# Patient Record
Sex: FEMALE | Race: WHITE | NOT HISPANIC OR LATINO | Employment: FULL TIME | URBAN - METROPOLITAN AREA
[De-identification: names, ages, dates, MRNs, and addresses within clinical notes are randomized per-mention and may not be internally consistent; named-entity substitution may affect disease eponyms.]

---

## 2017-12-12 ENCOUNTER — OFFICE VISIT (OUTPATIENT)
Dept: OBSTETRICS AND GYNECOLOGY | Facility: CLINIC | Age: 42
End: 2017-12-12

## 2017-12-12 VITALS
DIASTOLIC BLOOD PRESSURE: 72 MMHG | BODY MASS INDEX: 37.12 KG/M2 | WEIGHT: 231 LBS | SYSTOLIC BLOOD PRESSURE: 128 MMHG | HEIGHT: 66 IN

## 2017-12-12 DIAGNOSIS — Z01.419 WELL WOMAN EXAM WITH ROUTINE GYNECOLOGICAL EXAM: Primary | ICD-10-CM

## 2017-12-12 PROCEDURE — 99396 PREV VISIT EST AGE 40-64: CPT | Performed by: NURSE PRACTITIONER

## 2017-12-12 RX ORDER — OMEPRAZOLE 20 MG/1
20 CAPSULE, DELAYED RELEASE ORAL DAILY
COMMUNITY
End: 2022-04-01

## 2017-12-12 NOTE — PROGRESS NOTES
[facility]  Routine Annual Visit    2017    Patient: Rosie Guzman          MR#:[id number]      History of Present Illness    42 y.o. female  who presents for annual exam. Former pt of Dr. Ring. Last pap negative, HPV negative 2016. Mammogram today. Rosie reports regular, monthly cycles about every 31 days with light bleeding and mild cramping. She is using husbands vasectomy for contraception. She has no problems or complaints today. She denies any medical hx.      Patient's last menstrual period was 2017.  Obstetric History:  OB History      Para Term  AB Living    3 2 2  1 2    SAB TAB Ectopic Multiple Live Births    1    2         Menstrual History:     Patient's last menstrual period was 2017.       Sexual History:       ________________________________________  There is no problem list on file for this patient.      Past Medical History:   Diagnosis Date   • Acne    • Weight gain        Past Surgical History:   Procedure Laterality Date   •  SECTION     • SEPTOPLASTY         History   Smoking Status   • Never Smoker   Smokeless Tobacco   • Never Used       Family History   Problem Relation Age of Onset   • Heart disease Maternal Grandmother    • Diabetes Maternal Grandmother    • Heart attack Brother    • No Known Problems Son    • No Known Problems Daughter        Prior to Admission medications    Medication Sig Start Date End Date Taking? Authorizing Provider   ACZONE 5 % topical gel APPLY TO THE FACE BID 16   Historical Provider, MD   esomeprazole (nexIUM) 20 MG capsule Take 20 mg by mouth Every Morning Before Breakfast.    Historical Provider, MD   melatonin 1 MG tablet Take  by mouth.    Historical Provider, MD   phytonadione (MEPHYTON, VITAMIN K) 5 MG tablet Take 5 mg by mouth 1 (One) Time.    Historical Provider, MD   progesterone (PROMETRIUM) 200 MG capsule Take 1 capsule by mouth Daily. 16   Genevieve Ring MD  "  spironolactone (ALDACTONE) 100 MG tablet Take 100 mg by mouth Daily.    Historical Provider, MD   tretinoin microspheres (RETIN-A MICRO) 0.04 % gel APPLY TO FACE QHS 10/13/16   Historical Provider, MD   vitamin B-12 (CYANOCOBALAMIN) 100 MCG tablet Take 50 mcg by mouth Daily.    Historical Provider, MD   vitamin D (ERGOCALCIFEROL) 57356 UNITS capsule capsule Take 50,000 Units by mouth 1 (One) Time Per Week.    Historical Provider, MD     ________________________________________        The following portions of the patient's history were reviewed and updated as appropriate: allergies, current medications, past family history, past medical history, past social history, past surgical history and problem list.    Review of Systems   Constitutional: Negative.    HENT: Negative.    Eyes: Negative for visual disturbance.   Respiratory: Negative for cough, shortness of breath and wheezing.    Cardiovascular: Negative for chest pain, palpitations and leg swelling.   Gastrointestinal: Negative for abdominal distention, abdominal pain, blood in stool, constipation, diarrhea, nausea and vomiting.   Endocrine: Negative for cold intolerance and heat intolerance.   Genitourinary: Negative for difficulty urinating, dyspareunia, dysuria, frequency, genital sores, hematuria, menstrual problem, pelvic pain, urgency, vaginal bleeding, vaginal discharge and vaginal pain.   Musculoskeletal: Negative.    Skin: Negative.    Neurological: Negative for dizziness, weakness, light-headedness, numbness and headaches.   Hematological: Negative.    Psychiatric/Behavioral: Negative.    Breasts: negative for lumps skin changes, dimpling, swelling, nipple changes/discharge bilaterally       Objective   Physical Exam    /72  Ht 167.6 cm (66\")  Wt 105 kg (231 lb)  LMP 11/05/2017  BMI 37.28 kg/m2   BP Readings from Last 3 Encounters:   12/12/17 128/72   11/17/16 122/80      Wt Readings from Last 3 Encounters:   12/12/17 105 kg (231 lb) " "  11/17/16 97.6 kg (215 lb 3.2 oz)        BMI: Estimated body mass index is 37.28 kg/(m^2) as calculated from the following:    Height as of this encounter: 167.6 cm (66\").    Weight as of this encounter: 105 kg (231 lb).        General:   alert, appears stated age and cooperative   Heart: regular rate and rhythm, S1, S2 normal, no murmur, click, rub or gallop   Lungs: clear to auscultation bilaterally   Abdomen: soft, non-tender, without masses or organomegaly   Breast: inspection negative, no nipple discharge or bleeding, no masses or nodularity palpable   Vulva: normal   Vagina: normal mucosa, normal discharge   Cervix: no bleeding following Pap, no cervical motion tenderness and no lesions   Uterus: normal size, mobile, non-tender or normal shape and consistency   Adnexa: exam limited by habitus     Assessment:    1. Normal annual exam   2. Healthy lifestyle modifications discussed, counseled on self breast exams and bone health      Plan:    []  Rx:   [x]  Mammogram  [x]  PAP done  []  Occult fecal blood test (Insure)  []  Labs:   []  GC/Chl/TV  []  DEXA scan   []  Referral for colonoscopy:           ELIECER Michel  12/12/2017 9:29 AM  "

## 2017-12-14 LAB
CYTOLOGIST CVX/VAG CYTO: NORMAL
CYTOLOGY CVX/VAG DOC THIN PREP: NORMAL
DX ICD CODE: NORMAL
HIV 1 & 2 AB SER-IMP: NORMAL
HPV I/H RISK 4 DNA CVX QL PROBE+SIG AMP: NEGATIVE
OTHER STN SPEC: NORMAL
PATH REPORT.FINAL DX SPEC: NORMAL
STAT OF ADQ CVX/VAG CYTO-IMP: NORMAL

## 2017-12-18 ENCOUNTER — TELEPHONE (OUTPATIENT)
Dept: OBSTETRICS AND GYNECOLOGY | Facility: CLINIC | Age: 42
End: 2017-12-18

## 2017-12-18 NOTE — TELEPHONE ENCOUNTER
----- Message from ELIECER Durham sent at 12/14/2017  4:17 PM EST -----  Please inform patient her mammogram was normal. Repeat 1 year. Also pap returned negative. Thank you.

## 2022-04-01 ENCOUNTER — OFFICE VISIT (OUTPATIENT)
Dept: OBSTETRICS AND GYNECOLOGY | Age: 47
End: 2022-04-01

## 2022-04-01 VITALS
BODY MASS INDEX: 30.41 KG/M2 | WEIGHT: 189.2 LBS | SYSTOLIC BLOOD PRESSURE: 124 MMHG | HEIGHT: 66 IN | DIASTOLIC BLOOD PRESSURE: 72 MMHG

## 2022-04-01 DIAGNOSIS — Z76.89 ENCOUNTER TO ESTABLISH CARE: ICD-10-CM

## 2022-04-01 DIAGNOSIS — R87.610 ASCUS WITH POSITIVE HIGH RISK HPV CERVICAL: Primary | ICD-10-CM

## 2022-04-01 DIAGNOSIS — R87.810 ASCUS WITH POSITIVE HIGH RISK HPV CERVICAL: Primary | ICD-10-CM

## 2022-04-01 PROCEDURE — 99203 OFFICE O/P NEW LOW 30 MIN: CPT | Performed by: STUDENT IN AN ORGANIZED HEALTH CARE EDUCATION/TRAINING PROGRAM

## 2022-04-01 RX ORDER — METFORMIN HYDROCHLORIDE 500 MG/1
500 TABLET, EXTENDED RELEASE ORAL 2 TIMES DAILY
COMMUNITY
Start: 2022-03-11

## 2022-04-01 RX ORDER — TRAZODONE HYDROCHLORIDE 50 MG/1
50 TABLET ORAL NIGHTLY PRN
COMMUNITY
Start: 2022-03-13

## 2022-04-01 RX ORDER — DIETHYLPROPION HYDROCHLORIDE 75 MG/1
TABLET ORAL
COMMUNITY
Start: 2022-03-22

## 2022-04-01 NOTE — PROGRESS NOTES
[facility]   Obstetrics and Gynecology   New Gynecology Visit    2022    Patient: Rosie Guzman          MR#:[id number]    History of Present Illness    Chief Complaint   Patient presents with   • Establish Care   • Abnormal Pap Smear     NGYN - Reestablish care - Last AE 2017 w/pap nml and HPV neg, Mg 2017, Pap 03/15/2022 abnormal       47 y.o. female  who presents for follow-up of ASCUS and HPV positive Pap on 3/15/22 (in media tab).  Denies immunocompromised state and tobacco use.  Sexually active with  only but they are discussing separation.  Reports no menses for 18 months now.  Also having hot flashes and night sweats.  Recent abnormal mammogram that is admits to follow-up currently.  Recent DEXA showed osteopenia.      Obstetric History:  OB History        3    Para   2    Term   2            AB   1    Living   2       SAB   1    IAB        Ectopic        Molar        Multiple        Live Births   2               Menstrual History:     No LMP recorded (lmp unknown). Patient is perimenopausal.       Sexual History:  Sexually active with  only, discussing separation at this time    Social History:  Denies tobacco use  ________________________________________  There is no problem list on file for this patient.    Past Medical History:   Diagnosis Date   • Acne    • Weight gain      Past Surgical History:   Procedure Laterality Date   •  SECTION     • SEPTOPLASTY       Social History     Tobacco Use   Smoking Status Never Smoker   Smokeless Tobacco Never Used     Family History   Problem Relation Age of Onset   • No Known Problems Father    • No Known Problems Mother    • Heart attack Brother    • No Known Problems Son    • No Known Problems Daughter    • Heart disease Maternal Grandmother    • Diabetes Maternal Grandmother    • Breast cancer Neg Hx    • Ovarian cancer Neg Hx    • Uterine cancer Neg Hx    • Colon cancer Neg Hx       Prior to Admission medications    Medication Sig Start Date End Date Taking? Authorizing Provider   Diethylpropion HCl ER 75 MG tablet sustained-release 24 hour TAKE 1 TABLET BY MOUTH EVERY DAY LATE MORNING 3/22/22  Yes Jenifer Son MD   esomeprazole (nexIUM) 20 MG capsule Take 20 mg by mouth Every Morning Before Breakfast.   Yes Jenifer Son MD   melatonin 1 MG tablet Take  by mouth.   Yes Jenifer Son MD   metFORMIN ER (GLUCOPHAGE-XR) 500 MG 24 hr tablet Take 500 mg by mouth 2 (Two) Times a Day. 3/11/22  Yes Jenifer Son MD   phytonadione (MEPHYTON, VITAMIN K) 5 MG tablet Take 5 mg by mouth 1 (One) Time.   Yes Jenifer Son MD   traZODone (DESYREL) 50 MG tablet Take 50 mg by mouth At Night As Needed. 3/13/22  Yes Jenifer Son MD   vitamin B-12 (CYANOCOBALAMIN) 100 MCG tablet Take 50 mcg by mouth Daily.   Yes Jenifer Son MD   vitamin D (ERGOCALCIFEROL) 22845 UNITS capsule capsule Take 50,000 Units by mouth 1 (One) Time Per Week.   Yes Jenifer Son MD   ACZONE 5 % topical gel APPLY TO THE FACE BID 11/8/16 4/1/22  Jenifer Son MD   omeprazole (priLOSEC) 20 MG capsule Take 20 mg by mouth Daily.  4/1/22  Jenifer Son MD   progesterone (PROMETRIUM) 200 MG capsule Take 1 capsule by mouth Daily. 11/17/16 4/1/22  Genevieve Ring MD   spironolactone (ALDACTONE) 100 MG tablet Take 100 mg by mouth Daily.  4/1/22  Jenifer Son MD   tretinoin microspheres (RETIN-A MICRO) 0.04 % gel APPLY TO FACE QHS 10/13/16 4/1/22  Jenifer Son MD     ________________________________________    The following portions of the patient's history were reviewed and updated as appropriate: allergies, current medications, past family history, past medical history, past social history, past surgical history and problem list.    Review of Systems   All other systems reviewed and are negative.           Objective     /72   Ht 167.6  "cm (66\")   Wt 85.8 kg (189 lb 3.2 oz)   LMP  (LMP Unknown)   Breastfeeding No   BMI 30.54 kg/m²    BP Readings from Last 3 Encounters:   04/01/22 124/72   12/12/17 128/72   11/17/16 122/80      Wt Readings from Last 3 Encounters:   04/01/22 85.8 kg (189 lb 3.2 oz)   12/12/17 105 kg (231 lb)   11/17/16 97.6 kg (215 lb 3.2 oz)        BMI: Estimated body mass index is 30.54 kg/m² as calculated from the following:    Height as of this encounter: 167.6 cm (66\").    Weight as of this encounter: 85.8 kg (189 lb 3.2 oz).    Physical Exam  Vitals and nursing note reviewed.   Constitutional:       General: She is not in acute distress.     Appearance: Normal appearance.   Pulmonary:      Effort: Pulmonary effort is normal. No respiratory distress.   Neurological:      General: No focal deficit present.      Mental Status: She is alert and oriented to person, place, and time.   Psychiatric:         Mood and Affect: Mood normal.         Behavior: Behavior normal.         Thought Content: Thought content normal.         Judgment: Judgment normal.             Assessment:  Diagnoses and all orders for this visit:    1. ASCUS with positive high risk HPV cervical (Primary)    2. Encounter to establish care    -Discussed pathophysiology of cervical cancer and its screenings.  Discussed presence of HPV.  Recommendation for colposcopy.  Schedule ASAP.  -Patient would like to establish annual care here after colpo.    Plan:  Return for colpo next Friday.    I spent 30 minutes caring for Rosie on this date of service. This time includes time spent by me in the following activities: preparing for the visit, reviewing tests, obtaining and/or reviewing a separately obtained history, performing a medically appropriate examination and/or evaluation, counseling and educating the patient/family/caregiver, ordering medications, tests, or procedures and documenting information in the medical record    Heather Leon MD  4/1/2022 14:29 " EDT

## 2022-04-08 ENCOUNTER — OFFICE VISIT (OUTPATIENT)
Dept: OBSTETRICS AND GYNECOLOGY | Age: 47
End: 2022-04-08

## 2022-04-08 VITALS
SYSTOLIC BLOOD PRESSURE: 122 MMHG | HEIGHT: 66 IN | WEIGHT: 186 LBS | BODY MASS INDEX: 29.89 KG/M2 | DIASTOLIC BLOOD PRESSURE: 74 MMHG

## 2022-04-08 DIAGNOSIS — Z01.818 PREPROCEDURAL EXAMINATION: ICD-10-CM

## 2022-04-08 DIAGNOSIS — R87.610 ATYPICAL SQUAMOUS CELLS OF UNDETERMINED SIGNIFICANCE ON CYTOLOGIC SMEAR OF CERVIX (ASC-US): Primary | ICD-10-CM

## 2022-04-08 LAB
B-HCG UR QL: NEGATIVE
EXPIRATION DATE: NORMAL
INTERNAL NEGATIVE CONTROL: NEGATIVE
INTERNAL POSITIVE CONTROL: POSITIVE
Lab: NORMAL

## 2022-04-08 PROCEDURE — 81025 URINE PREGNANCY TEST: CPT | Performed by: STUDENT IN AN ORGANIZED HEALTH CARE EDUCATION/TRAINING PROGRAM

## 2022-04-08 PROCEDURE — 57454 BX/CURETT OF CERVIX W/SCOPE: CPT | Performed by: STUDENT IN AN ORGANIZED HEALTH CARE EDUCATION/TRAINING PROGRAM

## 2022-04-08 NOTE — PROGRESS NOTES
Colposcopy Procedure Note    Indications: Recent pap smear showed ASCUS/HPV pos    Procedure Details   The risks and benefits of the procedure were reviewed, and verbal informed consent was obtained.  Speculum was placed in vagina, and excellent visualization of cervix achieved.  Cervix was swabbed x 3 with acetic acid solution and then Lugol's solution.  Colposcopic exam revealed findings noted above.  Cervical biopsy was performed at 12 o'clock.  Endocervical curettage was performed but cervix was stenotic. Hemostasis was achieved with combination of silver nitrate and Monsel's solution.    Findings: complete squamocolumnar junction was identified, cervix showed acetowhite changes and nonstaining area at 12 o'clock (width from 11-1 o'clock; neg margins could be achieved if necessary)    Specimens: endocervical curettings, cervical biopsy at 12 o'clock    Complications: none, patient tolerated the procedure well    Plan:  Specimens sent to pathology  Will base treatment plan on these results  Patient instructed to take NSAIDs for pain control  No follow up appointment needed at this time, will call patient with results    Patient did obtain f/u diagnostic mammo and Us at [facility] that were normal, plan to repeat mammo in 1 yr    Heather Leon MD  04/08/22  13:18 EDT

## 2022-04-12 LAB
DX ICD CODE: NORMAL
DX ICD CODE: NORMAL
PATH REPORT.FINAL DX SPEC: NORMAL
PATH REPORT.GROSS SPEC: NORMAL
PATH REPORT.SITE OF ORIGIN SPEC: NORMAL
PATHOLOGIST NAME: NORMAL
PAYMENT PROCEDURE: NORMAL

## 2022-04-13 ENCOUNTER — TELEPHONE (OUTPATIENT)
Dept: OBSTETRICS AND GYNECOLOGY | Age: 47
End: 2022-04-13

## 2022-04-13 NOTE — PROGRESS NOTES
Please call patient to let her know that her cervical biopsy showed EUGENIA-1.  This is considered a low-grade lesion so we we will plan to repeat Pap smear in 1 year; please schedule this annual care visit.  She is at a slightly higher risk of developing cervical cancer so it is important to maintain close follow-up.  Please let me know she has any questions.

## 2022-04-13 NOTE — TELEPHONE ENCOUNTER
TC for results:  cervical biopsy showed UEGENIA-1.  This is considered a low-grade lesion so we we will plan to repeat Pap smear in 1 year; please schedule this annual care visit.  She is at a slightly higher risk of developing cervical cancer so it is important to maintain close follow-up.  Please let me know she has any questions.

## 2022-05-13 ENCOUNTER — PATIENT ROUNDING (BHMG ONLY) (OUTPATIENT)
Dept: OBSTETRICS AND GYNECOLOGY | Age: 47
End: 2022-05-13

## 2022-05-13 NOTE — PROGRESS NOTES
May 13, 2022    [place], may I speak with Rosie Guzman?    My name is Yenny Martel     I am  with [facility]  [address]   [facility] [address] [phone].    Before we get started may I verify your date of birth? 1975    I am calling to officially welcome you to our practice and ask about your recent visit. Is this a good time to talk? No, voicemail left     Tell me about your visit with us. What things went well?       We're always looking for ways to make our patients' experiences even better. Do you have recommendations on ways we may improve?     Overall were you satisfied with your first visit to our practice?      I appreciate you taking the time to speak with me today. Is there anything else I can do for you?     Thank you, and have a great day.

## 2025-07-08 ENCOUNTER — OFFICE VISIT (OUTPATIENT)
Dept: FAMILY MEDICINE CLINIC | Facility: CLINIC | Age: 50
End: 2025-07-08
Payer: COMMERCIAL

## 2025-07-08 VITALS
TEMPERATURE: 98.3 F | BODY MASS INDEX: 36.54 KG/M2 | HEIGHT: 67 IN | HEART RATE: 95 BPM | SYSTOLIC BLOOD PRESSURE: 144 MMHG | DIASTOLIC BLOOD PRESSURE: 92 MMHG | OXYGEN SATURATION: 100 % | WEIGHT: 232.8 LBS

## 2025-07-08 DIAGNOSIS — N87.0 DYSPLASIA OF CERVIX, LOW GRADE (CIN 1): ICD-10-CM

## 2025-07-08 DIAGNOSIS — R03.0 ELEVATED BLOOD PRESSURE READING: ICD-10-CM

## 2025-07-08 DIAGNOSIS — Z11.59 NEED FOR HEPATITIS C SCREENING TEST: ICD-10-CM

## 2025-07-08 DIAGNOSIS — F43.23 ADJUSTMENT DISORDER WITH MIXED ANXIETY AND DEPRESSED MOOD: Primary | ICD-10-CM

## 2025-07-08 DIAGNOSIS — R73.09 ELEVATED GLUCOSE: ICD-10-CM

## 2025-07-08 DIAGNOSIS — R53.83 OTHER FATIGUE: ICD-10-CM

## 2025-07-08 DIAGNOSIS — R63.5 WEIGHT GAIN: ICD-10-CM

## 2025-07-08 PROCEDURE — 99204 OFFICE O/P NEW MOD 45 MIN: CPT | Performed by: FAMILY MEDICINE

## 2025-07-08 RX ORDER — VENLAFAXINE HYDROCHLORIDE 75 MG/1
75 CAPSULE, EXTENDED RELEASE ORAL DAILY
Qty: 30 CAPSULE | Refills: 1 | Status: SHIPPED | OUTPATIENT
Start: 2025-07-08

## 2025-07-08 NOTE — PROGRESS NOTES
Chief Complaint  Establish Care, feet pain, Anxiety, Depression, Urinary Incontinence, and Dyspareunia    Subjective        Rosie Guzman presents to [facility]  History of Present Illness  History of Present Illness  The patient is a 50-year-old female who presents to [facility] as a new patient.    She has been under significant stress due to her separation from her , the death of her father and mother-in-law, and multiple relocations. She is currently going through menopause and has developed a fear of doctors. Despite these challenges, she is making efforts to prioritize her own health. She has been in therapy but struggles with focus due to ADHD. She has not seen her therapist since 10/2024 due to work and family commitments. She is not legally  from her  and is not considering reconciliation. She works remotely as a  in [email or web address], a role that involves high expectations and responsibility for a team. She feels deceived by a long-time friend who interfered in her marriage. She has two close friends for support. She has four children and two grandchildren, not all of whom live with her. Her brother passed away at 43 years old in 2015 due to a heart attack following surgery. She has recently discovered an older sister through [email or web address]. She was previously seeing a counselor with her  to manage their separation and family matters. She has raised her 's two children since they were 9 and 11 years old. She has not seen a psychiatrist or considered medication. She reports no suicidal thoughts. She believes she needs to reconnect with herself before she can connect with others. She wants to improve her mental clarity and productivity. She has been practicing self-regulation and coping techniques such as breathing exercises, which she finds helpful. She has been sleeping better recently, averaging 7.5 to 8  "hours per night, although she occasionally wets the bed. She has gained about 40 pounds since 2024 and consumes food emotionally. She has experienced significant weight fluctuations, losing up to 100 pounds at a time, which she can maintain for a year or two. She has been on Zoloft in the past, which initially helped but became less effective after three months. She did not follow up for dose adjustment as she did not want to be on medication long-term. She experiences brain fog and dizziness and feels her hormones are imbalanced. She reports no hallucinations or delusions. Her last blood work was done in  or . She believes menopause has worsened her brain fog and hot flashes. She also reports excessive fatigue.    She has not had a menstrual cycle for 4 to 5 years. Her last gynecologist visit was in , where she was diagnosed with HPV and had a biopsy. She has not followed up since then.    She has a rash on her neck that appeared two days ago, which she attributes to a new vitamin C product. The rash does not itch.    She experiences edema and describes a sensation like walking on gravel, which she suspects might be neuropathy. She has a toenail fungus that has spread and remains untreated. She has a family history of diabetes and has had high blood sugar levels in the past.    SOCIAL HISTORY  She works remotely as a  in [email or web address].    FAMILY HISTORY  Her father had a stroke.  Her mother-in-law passed away.  Her brother  at age 43 from a heart attack.  She has a family history of diabetes.       Objective   Vital Signs:  /92   Pulse 95   Temp 98.3 °F (36.8 °C)   Ht 170.2 cm (67\")   Wt 106 kg (232 lb 12.8 oz)   SpO2 100%   BMI 36.46 kg/m²   Estimated body mass index is 36.46 kg/m² as calculated from the following:    Height as of this encounter: 170.2 cm (67\").    Weight as of this encounter: 106 kg (232 lb 12.8 oz).             Physical Exam "   Physical Exam  General: Alert, in no acute distress, tearful  Ears: TMs are clear  Eyes: Pupils are equal  Mouth/Throat: Oral mucosa is clear  Neck: Supple  Respiratory: Clear to auscultation, no wheezing, rales or rhonchi  Cardiovascular: Regular rate and rhythm, no murmurs, rubs, or gallops  Skin: Rash on neck       Result Review :          Results                Assessment and Plan     Diagnoses and all orders for this visit:    1. Adjustment disorder with mixed anxiety and depressed mood (Primary)  -     venlafaxine XR (Effexor XR) 75 MG 24 hr capsule; Take 1 capsule by mouth Daily.  Dispense: 30 capsule; Refill: 1    2. Weight gain  -     TSH  -     Lipid Panel  -     Cortisol  -     CBC & Differential  -     Comprehensive Metabolic Panel    3. Elevated blood pressure reading  -     TSH  -     Lipid Panel  -     CBC & Differential  -     Comprehensive Metabolic Panel    4. Dysplasia of cervix, low grade (EUGENIA 1)    5. Need for hepatitis C screening test  -     Hepatitis C Antibody    6. Other fatigue  -     TSH  -     CBC & Differential  -     Comprehensive Metabolic Panel  -     Vitamin B12    7. Elevated glucose  -     Hemoglobin A1c      Assessment & Plan  1. Adjustment disorder with mixed depression and anxiety.  - Symptoms suggest a possible adjustment disorder with mixed depression and anxiety, likely due to recent traumatic events.  - Overlap between these conditions makes it challenging to pinpoint a specific diagnosis; hormonal imbalance contributing to mood swings was discussed.  - Venlafaxine 75 mg will be initiated, with potential to increase to 150 mg and a maximum of 225 mg if necessary; advised to take in the morning due to energizing properties, switch to an hour before bedtime if it induces sleepiness.  - Comprehensive lab workup today including hep C antibody, blood count, chemistry, TSH, lipid panel, cortisol level, B12, and A1c; encouraged to reconnect with therapist and inform immediately  if condition deteriorates after starting medication.    2. Menopausal symptoms.  - Reports experiencing brain fog and hot flashes attributed to menopause.  - Potential need for hormone therapy will be considered after initiating mood medication and developing emotional strength for gynecological examination, mammogram, and Pap smear.  - Last gynecological examination in 2022; HPV and biopsy results noted.  - Advised to follow up with gynecologist for cervix examination.    3. Weight gain.  - Gained approximately 40 pounds since 09/2024; could be related to stress, emotional eating, or potential metabolic issues.  - Comprehensive lab workup today including hep C antibody, blood count, chemistry, TSH, lipid panel, cortisol level, B12, and A1c.  - Discussed emotional eating and need to find alternative sources of dopamine.    4. Neuropathy.  - Reports experiencing swelling and sensation like walking on gravel, which may indicate neuropathy.  - Could be related to thyroid issues, B12 deficiency, or diabetes; diabetes runs in family.  - Comprehensive lab workup today including hep C antibody, blood count, chemistry, TSH, lipid panel, cortisol level, B12, and A1c; A1c specifically noted due to high sugars and potential diabetes.    Follow-up  - Follow-up appointment scheduled for 4 weeks from now; mood follow-up can be virtual.  - Full physical to be scheduled after reviewing lab results.            Follow Up     Return in about 4 weeks (around 8/5/2025) for Recheck mood (virtual OK).  Patient was given instructions and counseling regarding her condition or for health maintenance advice. Please see specific information pulled into the AVS if appropriate.    Patient or patient representative verbalized consent for the use of Ambient Listening during the visit with  Meredith Lea Kehrer, MD for chart documentation. 7/8/2025  10:45 EDT

## 2025-07-09 LAB
ALBUMIN SERPL-MCNC: 4.4 G/DL (ref 3.9–4.9)
ALP SERPL-CCNC: 131 IU/L (ref 44–121)
ALT SERPL-CCNC: 31 IU/L (ref 0–32)
AST SERPL-CCNC: 17 IU/L (ref 0–40)
BASOPHILS # BLD AUTO: 0.1 X10E3/UL (ref 0–0.2)
BASOPHILS NFR BLD AUTO: 1 %
BILIRUB SERPL-MCNC: 0.3 MG/DL (ref 0–1.2)
BUN SERPL-MCNC: 11 MG/DL (ref 6–24)
BUN/CREAT SERPL: 19 (ref 9–23)
CALCIUM SERPL-MCNC: 9.1 MG/DL (ref 8.7–10.2)
CHLORIDE SERPL-SCNC: 99 MMOL/L (ref 96–106)
CHOLEST SERPL-MCNC: 228 MG/DL (ref 100–199)
CO2 SERPL-SCNC: 20 MMOL/L (ref 20–29)
CORTIS SERPL-MCNC: 7.2 UG/DL (ref 6.2–19.4)
CREAT SERPL-MCNC: 0.57 MG/DL (ref 0.57–1)
EGFRCR SERPLBLD CKD-EPI 2021: 111 ML/MIN/1.73
EOSINOPHIL # BLD AUTO: 0.1 X10E3/UL (ref 0–0.4)
EOSINOPHIL NFR BLD AUTO: 2 %
ERYTHROCYTE [DISTWIDTH] IN BLOOD BY AUTOMATED COUNT: 13.5 % (ref 11.7–15.4)
GLOBULIN SER CALC-MCNC: 2.7 G/DL (ref 1.5–4.5)
GLUCOSE SERPL-MCNC: 322 MG/DL (ref 70–99)
HBA1C MFR BLD: 13.2 % (ref 4.8–5.6)
HCT VFR BLD AUTO: 47.6 % (ref 34–46.6)
HCV IGG SERPL QL IA: NON REACTIVE
HDLC SERPL-MCNC: 38 MG/DL
HGB BLD-MCNC: 14.9 G/DL (ref 11.1–15.9)
IMM GRANULOCYTES # BLD AUTO: 0 X10E3/UL (ref 0–0.1)
IMM GRANULOCYTES NFR BLD AUTO: 1 %
LDLC SERPL CALC-MCNC: 153 MG/DL (ref 0–99)
LYMPHOCYTES # BLD AUTO: 1.7 X10E3/UL (ref 0.7–3.1)
LYMPHOCYTES NFR BLD AUTO: 27 %
MCH RBC QN AUTO: 28.2 PG (ref 26.6–33)
MCHC RBC AUTO-ENTMCNC: 31.3 G/DL (ref 31.5–35.7)
MCV RBC AUTO: 90 FL (ref 79–97)
MONOCYTES # BLD AUTO: 0.5 X10E3/UL (ref 0.1–0.9)
MONOCYTES NFR BLD AUTO: 8 %
NEUTROPHILS # BLD AUTO: 4 X10E3/UL (ref 1.4–7)
NEUTROPHILS NFR BLD AUTO: 61 %
PLATELET # BLD AUTO: 411 X10E3/UL (ref 150–450)
POTASSIUM SERPL-SCNC: 4.4 MMOL/L (ref 3.5–5.2)
PROT SERPL-MCNC: 7.1 G/DL (ref 6–8.5)
RBC # BLD AUTO: 5.28 X10E6/UL (ref 3.77–5.28)
SODIUM SERPL-SCNC: 135 MMOL/L (ref 134–144)
TRIGL SERPL-MCNC: 200 MG/DL (ref 0–149)
TSH SERPL DL<=0.005 MIU/L-ACNC: 0.79 UIU/ML (ref 0.45–4.5)
VIT B12 SERPL-MCNC: 1725 PG/ML (ref 232–1245)
VLDLC SERPL CALC-MCNC: 37 MG/DL (ref 5–40)
WBC # BLD AUTO: 6.4 X10E3/UL (ref 3.4–10.8)

## 2025-07-18 ENCOUNTER — PATIENT ROUNDING (BHMG ONLY) (OUTPATIENT)
Dept: FAMILY MEDICINE CLINIC | Facility: CLINIC | Age: 50
End: 2025-07-18
Payer: COMMERCIAL

## 2025-07-18 NOTE — PROGRESS NOTES
Sent Patient following in [employer] Message:  My name is Shady Alcala      I am the Practice Manager with   [facility] [place]  [address]  AND [address],   [facility] [address] [phone].    And    [facility] [address],  [place], [place] [id number] [phone]    I am messaging to officially welcome you to our practice and ask about your recent visit.     How did you hear about our practice/providers?    Tell me about your visit with us. What things went well?         We're always looking for ways to make our patients' experiences even better. Do you have recommendations on ways we may improve?       Overall were you satisfied with your first visit to our practice?        Is there anything else I can do for you?     You may receive a survey from [facility] Menendez via text or email to provide feedback about your visit.  We ask that you please take a few minutes to complete the survey and let us know how we are doing.  If for any reason you feel unable to give us the highest rating please let me know.      Thank you, and have a great day.

## 2025-08-05 ENCOUNTER — TELEMEDICINE (OUTPATIENT)
Dept: FAMILY MEDICINE CLINIC | Facility: CLINIC | Age: 50
End: 2025-08-05
Payer: COMMERCIAL

## 2025-08-05 DIAGNOSIS — E11.9 NEW ONSET TYPE 2 DIABETES MELLITUS: Primary | ICD-10-CM

## 2025-08-05 DIAGNOSIS — F43.23 ADJUSTMENT DISORDER WITH MIXED ANXIETY AND DEPRESSED MOOD: ICD-10-CM

## 2025-08-05 RX ORDER — ATORVASTATIN CALCIUM 20 MG/1
20 TABLET, FILM COATED ORAL NIGHTLY
Qty: 90 TABLET | Refills: 0 | Status: SHIPPED | OUTPATIENT
Start: 2025-08-05

## 2025-08-05 RX ORDER — METFORMIN HYDROCHLORIDE 500 MG/1
1500 TABLET, EXTENDED RELEASE ORAL
Qty: 270 TABLET | Refills: 0 | Status: SHIPPED | OUTPATIENT
Start: 2025-08-05